# Patient Record
Sex: MALE | Race: WHITE | URBAN - METROPOLITAN AREA
[De-identification: names, ages, dates, MRNs, and addresses within clinical notes are randomized per-mention and may not be internally consistent; named-entity substitution may affect disease eponyms.]

---

## 2018-04-06 NOTE — LETTER
07/11/23        Bernardo Negron   [address] [place] [address]      Dear Jeison Bloom,    [facility] records indicate that you have outstanding lab work and or testing that was ordered for you and has not yet been completed:      CBC With Diff      CMP      Lipid      TSH W Reflex To Free T4      PSA - Total   To provide you with the best possible care, please complete these orders at your earliest convenience. If you have recently completed these orders please disregard this letter. If you have any questions please call the office at: [phone].      Thank you,   Rianna Palmer MD verbalization

## 2023-06-07 ENCOUNTER — OFFICE VISIT (OUTPATIENT)
Dept: FAMILY MEDICINE CLINIC | Facility: CLINIC | Age: 58
End: 2023-06-07
Payer: COMMERCIAL

## 2023-06-07 VITALS
HEIGHT: 67.48 IN | SYSTOLIC BLOOD PRESSURE: 132 MMHG | DIASTOLIC BLOOD PRESSURE: 86 MMHG | OXYGEN SATURATION: 97 % | BODY MASS INDEX: 28.18 KG/M2 | RESPIRATION RATE: 18 BRPM | TEMPERATURE: 98 F | HEART RATE: 95 BPM | WEIGHT: 181.63 LBS

## 2023-06-07 DIAGNOSIS — Z13.29 SCREENING FOR ENDOCRINE, NUTRITIONAL, METABOLIC AND IMMUNITY DISORDER: ICD-10-CM

## 2023-06-07 DIAGNOSIS — Z13.228 SCREENING FOR ENDOCRINE, NUTRITIONAL, METABOLIC AND IMMUNITY DISORDER: ICD-10-CM

## 2023-06-07 DIAGNOSIS — Z12.5 SCREENING FOR PROSTATE CANCER: ICD-10-CM

## 2023-06-07 DIAGNOSIS — Z13.0 SCREENING FOR ENDOCRINE, NUTRITIONAL, METABOLIC AND IMMUNITY DISORDER: ICD-10-CM

## 2023-06-07 DIAGNOSIS — B00.1 HERPES LABIALIS: ICD-10-CM

## 2023-06-07 DIAGNOSIS — E66.3 OVERWEIGHT (BMI 25.0-29.9): ICD-10-CM

## 2023-06-07 DIAGNOSIS — Z85.828 HISTORY OF SKIN CANCER: ICD-10-CM

## 2023-06-07 DIAGNOSIS — Z13.21 SCREENING FOR ENDOCRINE, NUTRITIONAL, METABOLIC AND IMMUNITY DISORDER: ICD-10-CM

## 2023-06-07 DIAGNOSIS — Z13.6 SCREENING FOR ISCHEMIC HEART DISEASE: ICD-10-CM

## 2023-06-07 DIAGNOSIS — J30.1 SEASONAL ALLERGIC RHINITIS DUE TO POLLEN: ICD-10-CM

## 2023-06-07 DIAGNOSIS — Z00.00 ANNUAL PHYSICAL EXAM: Primary | ICD-10-CM

## 2023-06-07 PROCEDURE — 3079F DIAST BP 80-89 MM HG: CPT | Performed by: FAMILY MEDICINE

## 2023-06-07 PROCEDURE — 99386 PREV VISIT NEW AGE 40-64: CPT | Performed by: FAMILY MEDICINE

## 2023-06-07 PROCEDURE — 3008F BODY MASS INDEX DOCD: CPT | Performed by: FAMILY MEDICINE

## 2023-06-07 PROCEDURE — 3075F SYST BP GE 130 - 139MM HG: CPT | Performed by: FAMILY MEDICINE

## 2023-06-07 RX ORDER — ACYCLOVIR 50 MG/G
1 OINTMENT TOPICAL EVERY 6 HOURS PRN
Qty: 30 G | Refills: 1 | Status: SHIPPED | OUTPATIENT
Start: 2023-06-07

## 2023-06-07 RX ORDER — FLUTICASONE PROPIONATE 50 MCG
2 SPRAY, SUSPENSION (ML) NASAL DAILY
Qty: 1 EACH | Refills: 5 | Status: SHIPPED | OUTPATIENT
Start: 2023-06-07

## 2023-06-08 ENCOUNTER — TELEPHONE (OUTPATIENT)
Dept: FAMILY MEDICINE CLINIC | Facility: CLINIC | Age: 58
End: 2023-06-08

## 2023-06-08 NOTE — TELEPHONE ENCOUNTER
Record of realse sent to [facility] for colonoscopy report information at [phone].  Fax confirmed 6/8/23

## 2023-06-08 NOTE — TELEPHONE ENCOUNTER
Record of realest sent to  Bill Rebolledo at [phone] for shingles vaccine that was given to patient.  Fax confirmed 6/8/23

## 2023-06-29 ENCOUNTER — TELEPHONE (OUTPATIENT)
Dept: FAMILY MEDICINE CLINIC | Facility: CLINIC | Age: 58
End: 2023-06-29

## 2023-06-29 NOTE — TELEPHONE ENCOUNTER
Pt called office stating that his pharmacy stated that they never received the prescription for his allergy spray and for his a lip virus.  Pt wants to know what is going on

## 2023-06-29 NOTE — TELEPHONE ENCOUNTER
Spoke to pharmacy, they did receive the prescriptions however since patient did not pick them up when they  were ordered 6/ 7 they eventually put them back. The  prescriptions will be ready for pickup shortly.      Called patient and LMTCB

## 2023-09-09 LAB
ABSOLUTE BASOPHILS: 43 CELLS/UL (ref 0–200)
ABSOLUTE EOSINOPHILS: 223 CELLS/UL (ref 15–500)
ABSOLUTE LYMPHOCYTES: 2354 CELLS/UL (ref 850–3900)
ABSOLUTE MONOCYTES: 461 CELLS/UL (ref 200–950)
ABSOLUTE NEUTROPHILS: 4118 CELLS/UL (ref 1500–7800)
ALBUMIN/GLOBULIN RATIO: 1.4 (CALC) (ref 1–2.5)
ALBUMIN: 4.4 G/DL (ref 3.6–5.1)
ALKALINE PHOSPHATASE: 94 U/L (ref 35–144)
ALT: 47 U/L (ref 9–46)
AST: 36 U/L (ref 10–35)
BASOPHILS: 0.6 %
BILIRUBIN, TOTAL: 0.9 MG/DL (ref 0.2–1.2)
BUN: 12 MG/DL (ref 7–25)
CALCIUM: 9.4 MG/DL (ref 8.6–10.3)
CARBON DIOXIDE: 30 MMOL/L (ref 20–32)
CHLORIDE: 101 MMOL/L (ref 98–110)
CHOL/HDLC RATIO: 4.5 (CALC)
CHOLESTEROL, TOTAL: 194 MG/DL
CREATININE: 0.96 MG/DL (ref 0.7–1.3)
EGFR: 93 ML/MIN/1.73M2
EOSINOPHILS: 3.1 %
GLOBULIN: 3.2 G/DL (CALC) (ref 1.9–3.7)
GLUCOSE: 92 MG/DL (ref 65–99)
HDL CHOLESTEROL: 43 MG/DL
HEMATOCRIT: 51.2 % (ref 38.5–50)
HEMOGLOBIN: 17.2 G/DL (ref 13.2–17.1)
LDL-CHOLESTEROL: 125 MG/DL (CALC)
LYMPHOCYTES: 32.7 %
MCH: 31.4 PG (ref 27–33)
MCHC: 33.6 G/DL (ref 32–36)
MCV: 93.4 FL (ref 80–100)
MONOCYTES: 6.4 %
MPV: 9.6 FL (ref 7.5–12.5)
NEUTROPHILS: 57.2 %
NON-HDL CHOLESTEROL: 151 MG/DL (CALC)
PLATELET COUNT: 275 THOUSAND/UL (ref 140–400)
POTASSIUM: 4.6 MMOL/L (ref 3.5–5.3)
PROTEIN, TOTAL: 7.6 G/DL (ref 6.1–8.1)
PSA, TOTAL: 0.3 NG/ML
RDW: 12.2 % (ref 11–15)
RED BLOOD CELL COUNT: 5.48 MILLION/UL (ref 4.2–5.8)
SODIUM: 139 MMOL/L (ref 135–146)
TRIGLYCERIDES: 147 MG/DL
TSH W/REFLEX TO FT4: 3.14 MIU/L (ref 0.4–4.5)
WHITE BLOOD CELL COUNT: 7.2 THOUSAND/UL (ref 3.8–10.8)

## 2024-01-25 ENCOUNTER — MED REC SCAN ONLY (OUTPATIENT)
Dept: FAMILY MEDICINE CLINIC | Facility: CLINIC | Age: 59
End: 2024-01-25

## 2024-03-15 ENCOUNTER — OFFICE VISIT (OUTPATIENT)
Dept: FAMILY MEDICINE CLINIC | Facility: CLINIC | Age: 59
End: 2024-03-15
Payer: COMMERCIAL

## 2024-03-15 VITALS
SYSTOLIC BLOOD PRESSURE: 138 MMHG | RESPIRATION RATE: 18 BRPM | WEIGHT: 181.63 LBS | OXYGEN SATURATION: 97 % | BODY MASS INDEX: 28 KG/M2 | TEMPERATURE: 98 F | HEART RATE: 80 BPM | DIASTOLIC BLOOD PRESSURE: 84 MMHG

## 2024-03-15 DIAGNOSIS — Z23 NEED FOR VACCINATION: ICD-10-CM

## 2024-03-15 DIAGNOSIS — Z13.29 SCREENING FOR ENDOCRINE, NUTRITIONAL, METABOLIC AND IMMUNITY DISORDER: ICD-10-CM

## 2024-03-15 DIAGNOSIS — Z00.00 ANNUAL PHYSICAL EXAM: Primary | ICD-10-CM

## 2024-03-15 DIAGNOSIS — Z13.228 SCREENING FOR ENDOCRINE, NUTRITIONAL, METABOLIC AND IMMUNITY DISORDER: ICD-10-CM

## 2024-03-15 DIAGNOSIS — B00.1 HERPES LABIALIS: ICD-10-CM

## 2024-03-15 DIAGNOSIS — Z13.6 SCREENING FOR ISCHEMIC HEART DISEASE: ICD-10-CM

## 2024-03-15 DIAGNOSIS — J30.1 SEASONAL ALLERGIC RHINITIS DUE TO POLLEN: ICD-10-CM

## 2024-03-15 DIAGNOSIS — Z13.21 SCREENING FOR ENDOCRINE, NUTRITIONAL, METABOLIC AND IMMUNITY DISORDER: ICD-10-CM

## 2024-03-15 DIAGNOSIS — H10.9 BACTERIAL CONJUNCTIVITIS OF RIGHT EYE: ICD-10-CM

## 2024-03-15 DIAGNOSIS — Z13.0 SCREENING FOR ENDOCRINE, NUTRITIONAL, METABOLIC AND IMMUNITY DISORDER: ICD-10-CM

## 2024-03-15 PROCEDURE — 99396 PREV VISIT EST AGE 40-64: CPT | Performed by: FAMILY MEDICINE

## 2024-03-15 RX ORDER — POLYMYXIN B SULFATE AND TRIMETHOPRIM 1; 10000 MG/ML; [USP'U]/ML
1 SOLUTION OPHTHALMIC EVERY 6 HOURS
Qty: 10 ML | Refills: 0 | Status: SHIPPED | OUTPATIENT
Start: 2024-03-15 | End: 2024-03-22

## 2024-03-19 NOTE — PROGRESS NOTES
Chief Complaint   Patient presents with    Physical     Annual exam        HPI:   Mj Arechiga is a 58 year old male who presents for a complete physical exam.   Patient is present for complete physical. Feels very well. Up to date with dental visits.No hearing problems. Vaccinations: declines Flu    Annual physical:  Overall, pt states he feels well.     Sexually active: monogamous with wife  Last PSA: 9/2023, normal (rpt in 2 yrs)  Last colonoscopy: 9/2015 (rpt in 10 yrs)    Exercise: \"I need to move more.\"  Diet: regular, loves chocolate    Needs eyedrops:  he states when his allergies flareup, he is prone to pink eye and was Rx'd Polytrim eye drops in the past to use when this happens. He will use the drops for a few days and infection resolves.Currently he has no sx.    Seasonal allergies:  Takes Loratadine, finds this helpful with relieving sx.     Herpes labialis:  States he gets a flare up when he eats too much of peanuts. Uses Zovirax prn.                  Wt Readings from Last 3 Encounters:   03/15/24 181 lb 9.6 oz (82.4 kg)   06/07/23 181 lb 9.6 oz (82.4 kg)      BP Readings from Last 3 Encounters:   03/15/24 138/84   06/07/23 132/86       CHOLESTEROL, TOTAL (mg/dL)   Date Value   09/08/2023 194     HDL CHOLESTEROL (mg/dL)   Date Value   09/08/2023 43     LDL-CHOLESTEROL (mg/dL (calc))   Date Value   09/08/2023 125 (H)     AST (U/L)   Date Value   09/08/2023 36 (H)     ALT (U/L)   Date Value   09/08/2023 47 (H)      Current Outpatient Medications   Medication Sig Dispense Refill    polymyxin B-trimethoprim 98651-6.1 UNIT/ML-% Ophthalmic Solution Place 1 drop into the right eye every 6 (six) hours for 7 days. 10 mL 0    acyclovir 5 % External Ointment Apply 1 Application topically every 6 (six) hours as needed. 30 g 1    fluticasone propionate 50 MCG/ACT Nasal Suspension 2 sprays by Each Nare route daily. 1 each 5      Past Medical History:   Diagnosis Date    Allergic rhinitis       Past Surgical  History:   Procedure Laterality Date    DESTRUCTION OF SKIN LESION      removal of skin lesion on above left eyebrow 2011      Family History   Problem Relation Age of Onset    Dementia Mother         Alzheimer's    No Known Problems Father     No Known Problems Sister     No Known Problems Brother     Prostate Cancer Neg     Colon Cancer Neg       Social History     Socioeconomic History    Marital status:    Tobacco Use    Smoking status: Former    Smokeless tobacco: Never   Substance and Sexual Activity    Alcohol use: Yes    Drug use: Never   Other Topics Concern    Caffeine Concern No    Exercise Yes    Seat Belt Yes    Special Diet No    Stress Concern No    Weight Concern No     Exercise:  some walking at work, occasional.  Diet:  regular   Allergies:  No Known Allergies     REVIEW OF SYSTEMS:     Review of Systems   Constitutional:  Negative for appetite change and fatigue.   HENT:  Negative for congestion.    Eyes:  Negative for discharge and redness.   Gastrointestinal:  Negative for abdominal pain, constipation, diarrhea, nausea and vomiting.   Genitourinary:  Negative for dysuria.   Neurological:  Negative for dizziness and headaches.        EXAM:   /84 (BP Location: Left arm, Patient Position: Sitting, Cuff Size: adult)   Pulse 80   Temp 98.1 °F (36.7 °C) (Temporal)   Resp 18   Wt 181 lb 9.6 oz (82.4 kg)   SpO2 97%   BMI 28.04 kg/m²   GENERAL: WD/WN in no acute distress.   HEENT: PERRLA and EOMI.  OP moist no lesions. TM normal, canals normal.  NECK: is supple,thyroid- normal.  LUNGS: are clear to auscultation bilaterally, with no wheeze, rhonchi, or rales.  HEART: is RRR.  S1, S2, with no murmurs.    ABDOMEN: is soft, NT/ND with no HSM.  No rebound or guarding, NABS.     EXAM: deferred  RECTAL EXAM: deferred  EXTREMITIES: are symmetric with no cyanosis, clubbing, or edema.    SKIN: is unremarkable without rashes.    NEURO: no focal abnormalities, and reflexes coordination and gait  normal and symmetric.   MUSK/SKEL: Normal muscles tones, no joint abnormalities, full ROM of all extremities.    back- normal curves, no scoliosis, normal gait,  No joint or muscle abnormalities.  PSYCH: pt is alert, oriented x 3, normal affect.    ASSESSMENT AND PLAN:   Mj Arechiga is a 58 year old male who presents for a complete physical exam.     1. Annual physical exam  Routine labs ordered today, await results. Counseled pt on healthy lifestyle changes. Vaccines today: declines FLu  .    Last PSA: 9/2023, normal (rpt in 2 yrs)  Last colonoscopy: 9/2015 (rpt in 10 yrs)     - CBC With Differential With Platelet  - Comp Metabolic Panel  - Lipid Panel  - TSH W Reflex To Free T4    2. Bacterial conjunctivitis of right eye  - pt prone to pink eye when seasonal allergies flare up  - Rx for Polytrim eye drops to use prn, R/B/SE of new med d/w pt    - polymyxin B-trimethoprim 70338-3.1 UNIT/ML-% Ophthalmic Solution; Place 1 drop into the right eye every 6 (six) hours for 7 days.  Dispense: 10 mL; Refill: 0    3. Herpes labialis  - fill Acyclovir, flareups respond well to this medication    4. Seasonal allergic rhinitis due to pollen  - cont PO antihistmaine prn  - may useFluticasone nasal spray daily     5. Screening for ischemic heart disease    - Lipid Panel    6. Screening for endocrine, nutritional, metabolic and immunity disorder    - CBC With Differential With Platelet  - Comp Metabolic Panel  - TSH W Reflex To Free T4    7. Need for vaccination    - INFLUENZA REFUSED [facility]      Pt's weight is Body mass index is 28.04 kg/m²., recommended low fat diet and aerobic exercise 30 minutes three times weekly.   The patient indicates understanding of these issues and agrees to the plan.    Return in about 1 year (around 3/15/2025) for annual physical.

## 2024-05-09 LAB
ABSOLUTE BASOPHILS: 50 CELLS/UL (ref 0–200)
ABSOLUTE EOSINOPHILS: 170 CELLS/UL (ref 15–500)
ABSOLUTE LYMPHOCYTES: 1974 CELLS/UL (ref 850–3900)
ABSOLUTE MONOCYTES: 518 CELLS/UL (ref 200–950)
ABSOLUTE NEUTROPHILS: 4388 CELLS/UL (ref 1500–7800)
ALBUMIN/GLOBULIN RATIO: 1.5 (CALC) (ref 1–2.5)
ALBUMIN: 4.4 G/DL (ref 3.6–5.1)
ALKALINE PHOSPHATASE: 95 U/L (ref 35–144)
ALT: 36 U/L (ref 9–46)
AST: 30 U/L (ref 10–35)
BASOPHILS: 0.7 %
BILIRUBIN, TOTAL: 0.6 MG/DL (ref 0.2–1.2)
BUN: 14 MG/DL (ref 7–25)
CALCIUM: 9.1 MG/DL (ref 8.6–10.3)
CARBON DIOXIDE: 28 MMOL/L (ref 20–32)
CHLORIDE: 103 MMOL/L (ref 98–110)
CHOL/HDLC RATIO: 3.9 (CALC)
CHOLESTEROL, TOTAL: 182 MG/DL
CREATININE: 0.84 MG/DL (ref 0.7–1.3)
EGFR: 102 ML/MIN/1.73M2
EOSINOPHILS: 2.4 %
GLOBULIN: 3 G/DL (CALC) (ref 1.9–3.7)
GLUCOSE: 92 MG/DL (ref 65–99)
HDL CHOLESTEROL: 47 MG/DL
HEMATOCRIT: 49.4 % (ref 38.5–50)
HEMOGLOBIN: 16.9 G/DL (ref 13.2–17.1)
LDL-CHOLESTEROL: 110 MG/DL (CALC)
LYMPHOCYTES: 27.8 %
MCH: 31.6 PG (ref 27–33)
MCHC: 34.2 G/DL (ref 32–36)
MCV: 92.5 FL (ref 80–100)
MONOCYTES: 7.3 %
MPV: 9.5 FL (ref 7.5–12.5)
NEUTROPHILS: 61.8 %
NON-HDL CHOLESTEROL: 135 MG/DL (CALC)
PLATELET COUNT: 260 THOUSAND/UL (ref 140–400)
POTASSIUM: 4.2 MMOL/L (ref 3.5–5.3)
PROTEIN, TOTAL: 7.4 G/DL (ref 6.1–8.1)
RDW: 12.8 % (ref 11–15)
RED BLOOD CELL COUNT: 5.34 MILLION/UL (ref 4.2–5.8)
SODIUM: 140 MMOL/L (ref 135–146)
TRIGLYCERIDES: 139 MG/DL
TSH W/REFLEX TO FT4: 2.88 MIU/L (ref 0.4–4.5)
WHITE BLOOD CELL COUNT: 7.1 THOUSAND/UL (ref 3.8–10.8)

## 2024-07-23 ENCOUNTER — OFFICE VISIT (OUTPATIENT)
Dept: FAMILY MEDICINE CLINIC | Facility: CLINIC | Age: 59
End: 2024-07-23
Payer: COMMERCIAL

## 2024-07-23 VITALS
RESPIRATION RATE: 18 BRPM | BODY MASS INDEX: 28 KG/M2 | DIASTOLIC BLOOD PRESSURE: 78 MMHG | SYSTOLIC BLOOD PRESSURE: 132 MMHG | OXYGEN SATURATION: 99 % | TEMPERATURE: 97 F | WEIGHT: 179.19 LBS | HEART RATE: 80 BPM

## 2024-07-23 DIAGNOSIS — J34.3 NASAL TURBINATE HYPERTROPHY: ICD-10-CM

## 2024-07-23 DIAGNOSIS — R05.3 CHRONIC COUGH: Primary | ICD-10-CM

## 2024-07-23 PROCEDURE — 99214 OFFICE O/P EST MOD 30 MIN: CPT | Performed by: FAMILY MEDICINE

## 2024-07-23 RX ORDER — PREDNISONE 20 MG/1
20 TABLET ORAL 2 TIMES DAILY
Qty: 10 TABLET | Refills: 0 | Status: SHIPPED | OUTPATIENT
Start: 2024-07-23 | End: 2024-07-28

## 2024-07-23 RX ORDER — CHLORPHENIRAMINE MALEATE 4 MG/1
4 TABLET ORAL EVERY 6 HOURS PRN
Qty: 30 TABLET | Refills: 0 | Status: SHIPPED | OUTPATIENT
Start: 2024-07-23

## 2024-07-23 NOTE — PATIENT INSTRUCTIONS
Switch to allegra daily from zyrtec daily     Cont on flonase daily     Prednisone x 5 days     Chlorpheniramine as needed for cough     If no better in 2 weeks, notify office

## 2024-07-23 NOTE — PROGRESS NOTES
HPI:     Mj Arechiga is a 59 year old male presents for    Chronic cough for 4 weeks.  He normally sees Dr. Ferreira.  Was in [place] 3 weeks ago and received 3 injections of PCN there.  Went back home and did [facility] who gave him amox.  No improvement so went back to [facility] and got z pack.  Just finished this 3 days ago.  Still no better.  Has post nasal drip and coughing up yellow/green sputum in am.  No breathing difficulties.  Ears feel plugged.  Felt warm, not checking temperature.    No body aches.  Pain in throat.  Kids and wife were sick w/ similar but better now.  No cxr or COVID testing done.  Was told nothing they could do if it was COVID now.  Has been taking ibuprofen and mucinex which has not helped much.      Medications (Active prior to today's visit):  Current Outpatient Medications   Medication Sig Dispense Refill    acyclovir 5 % External Ointment Apply 1 Application topically every 6 (six) hours as needed. 30 g 1    fluticasone propionate 50 MCG/ACT Nasal Suspension 2 sprays by Each Nare route daily. 1 each 5       Allergies:  No Known Allergies    PSFH elements reviewed from today and agreed except as otherwise stated in HPI.  ROS:      Pertinent positives and negatives noted in the the HPI.    PHYSICAL EXAM:     Vitals:    07/23/24 0856   BP: 132/78   BP Location: Left arm   Patient Position: Sitting   Cuff Size: adult   Pulse: 80   Resp: 18   Temp: 97.2 °F (36.2 °C)   TempSrc: Temporal   SpO2: 99%   Weight: 179 lb 3.2 oz (81.3 kg)     Vital signs reviewed.Appears stated age, well groomed.  Physical Exam  Constitutional:       Appearance: Normal appearance. He is well-developed and well-groomed.   HENT:      Right Ear: Ear canal normal. A middle ear effusion (clear) is present.      Left Ear: Ear canal normal. A middle ear effusion (clear) is present.      Nose:      Right Turbinates: Enlarged and swollen.      Left Turbinates: Enlarged and swollen.      Mouth/Throat:      Mouth:  Mucous membranes are moist.      Pharynx: Oropharynx is clear.   Cardiovascular:      Rate and Rhythm: Normal rate and regular rhythm.      Pulses: Normal pulses.      Heart sounds: No murmur heard.     No friction rub. No gallop.   Pulmonary:      Effort: Pulmonary effort is normal. No respiratory distress.      Breath sounds: No wheezing, rhonchi or rales.   Musculoskeletal:         General: No tenderness.      Cervical back: Neck supple.      Right lower leg: No edema.      Left lower leg: No edema.   Lymphadenopathy:      Head:      Right side of head: No submental, submandibular, preauricular or posterior auricular adenopathy.      Left side of head: No submental, submandibular, preauricular or posterior auricular adenopathy.      Cervical: No cervical adenopathy.      Upper Body:      Right upper body: No supraclavicular adenopathy.      Left upper body: No supraclavicular adenopathy.   Skin:     General: Skin is warm.   Neurological:      General: No focal deficit present.      Mental Status: He is alert.   Psychiatric:         Mood and Affect: Mood normal.         Speech: Speech normal.         Behavior: Behavior normal. Behavior is cooperative.          ASSESSMENT/PLAN:   59 year old male with    1. Chronic cough    2. Nasal turbinate hypertrophy      I have spent 30 minutes in the care of this patient including review of their past medical records and diagnostic tests, updating their chart, seeing the patient, discussing current treatment plans and documenting the encounter.      Lung exam CTAB.  Suspect cough is coming from upper airways.  Recommend Flonase daily.  Also recommend switching to Allegra daily.  Can use chlorpheniramine as needed for cough  Also recommend prednisone burst 40 mg daily for 5 days  If no improvement in 2 weeks, consider follow-up with ENT  Follow-up with PCP as previously recommended by PCP    Patient/Caregiver Education: There are no barriers to learning. Medical education  done.   Outcome: Patient verbalizes understanding and agrees with plan. Advised to call or RTC if symptoms persist or worsen.    7/23/2024  Abena Mae, DO  Patient understands plan and follow-up.

## 2025-02-21 ENCOUNTER — OFFICE VISIT (OUTPATIENT)
Dept: FAMILY MEDICINE CLINIC | Facility: CLINIC | Age: 60
End: 2025-02-21
Payer: COMMERCIAL

## 2025-02-21 VITALS
RESPIRATION RATE: 18 BRPM | BODY MASS INDEX: 27.54 KG/M2 | TEMPERATURE: 97 F | HEIGHT: 68.4 IN | SYSTOLIC BLOOD PRESSURE: 142 MMHG | HEART RATE: 78 BPM | OXYGEN SATURATION: 100 % | DIASTOLIC BLOOD PRESSURE: 88 MMHG | WEIGHT: 183.81 LBS

## 2025-02-21 DIAGNOSIS — L73.1 INGROWN HAIR: ICD-10-CM

## 2025-02-21 DIAGNOSIS — Z12.11 COLON CANCER SCREENING: ICD-10-CM

## 2025-02-21 DIAGNOSIS — Z12.5 PROSTATE CANCER SCREENING: ICD-10-CM

## 2025-02-21 DIAGNOSIS — Z13.0 SCREENING FOR ENDOCRINE, NUTRITIONAL, METABOLIC AND IMMUNITY DISORDER: ICD-10-CM

## 2025-02-21 DIAGNOSIS — Z13.29 SCREENING FOR ENDOCRINE, NUTRITIONAL, METABOLIC AND IMMUNITY DISORDER: ICD-10-CM

## 2025-02-21 DIAGNOSIS — Z13.21 SCREENING FOR ENDOCRINE, NUTRITIONAL, METABOLIC AND IMMUNITY DISORDER: ICD-10-CM

## 2025-02-21 DIAGNOSIS — Z13.6 SCREENING FOR ISCHEMIC HEART DISEASE: ICD-10-CM

## 2025-02-21 DIAGNOSIS — Z00.00 ANNUAL PHYSICAL EXAM: Primary | ICD-10-CM

## 2025-02-21 DIAGNOSIS — Z13.228 SCREENING FOR ENDOCRINE, NUTRITIONAL, METABOLIC AND IMMUNITY DISORDER: ICD-10-CM

## 2025-02-21 DIAGNOSIS — Z23 NEED FOR VACCINATION: ICD-10-CM

## 2025-02-21 PROCEDURE — 99396 PREV VISIT EST AGE 40-64: CPT | Performed by: FAMILY MEDICINE

## 2025-02-21 RX ORDER — MUPIROCIN 20 MG/G
1 OINTMENT TOPICAL 3 TIMES DAILY
Qty: 22 G | Refills: 1 | Status: SHIPPED | OUTPATIENT
Start: 2025-02-21

## 2025-02-23 NOTE — PROGRESS NOTES
Chief Complaint   Patient presents with    Physical     ANNUAL EXAM        HPI:   Mj Arechiga is a 59 year old male who presents for a complete physical exam.   Patient is present for complete physical. Feels very well. Up to date with dental visits.No hearing problems. Vaccinations: declines vaccines.    Annual physical:  Overall, pt states he feels well.     Sexually active: monogamous  Last PSA: 2023, normal (ordered for )  Last Colon Ca screenin2015, colonoscopy normal (due later this year, referral given)    Exercise: working out 2x/week, stretching  Diet: regular, \"I love sugar\"    Derm problem: pt noticed on his lower abdomen he had a pimple that grew larger and slightly more tender; no drainage. No F/C. He noticed the lesion enlarged in a few days, but is less painful now.     Of note, pt's wife noticied PCP of pt snoring.  Pt admits to snoring, but denies any daytime drowsiness. He states he wakes up feeling refreshed in the morning. He has no apnea episodes at night.  He does not drink any caffeine.         Wt Readings from Last 3 Encounters:   25 183 lb 12.8 oz (83.4 kg)   24 179 lb 3.2 oz (81.3 kg)   03/15/24 181 lb 9.6 oz (82.4 kg)      BP Readings from Last 3 Encounters:   25 142/88   24 132/78   03/15/24 138/84       CHOLESTEROL, TOTAL (mg/dL)   Date Value   2024 182   2023 194     HDL CHOLESTEROL (mg/dL)   Date Value   2024 47   2023 43     LDL-CHOLESTEROL (mg/dL (calc))   Date Value   2024 110 (H)   2023 125 (H)     AST (U/L)   Date Value   2024 30   2023 36 (H)     ALT (U/L)   Date Value   2024 36   2023 47 (H)      Current Outpatient Medications   Medication Sig Dispense Refill    mupirocin 2 % External Ointment Apply 1 Application topically 3 (three) times daily. 22 g 1    chlorpheniramine 4 MG Oral Tab Take 1 tablet (4 mg total) by mouth every 6 (six) hours as needed. 30 tablet 0    acyclovir 5 %  External Ointment Apply 1 Application topically every 6 (six) hours as needed. 30 g 1    fluticasone propionate 50 MCG/ACT Nasal Suspension 2 sprays by Each Nare route daily. 1 each 5      Past Medical History:    Allergic rhinitis      Past Surgical History:   Procedure Laterality Date    Destruction of skin lesion      removal of skin lesion on above left eyebrow 2011      Family History   Problem Relation Age of Onset    Dementia Mother         Alzheimer's    No Known Problems Father     No Known Problems Sister     No Known Problems Brother     Prostate Cancer Neg     Colon Cancer Neg       Social History     Socioeconomic History    Marital status:    Tobacco Use    Smoking status: Never    Smokeless tobacco: Never   Vaping Use    Vaping status: Never Used   Substance and Sexual Activity    Alcohol use: Yes    Drug use: Never   Other Topics Concern    Caffeine Concern No    Exercise Yes    Seat Belt Yes    Special Diet No    Stress Concern No    Weight Concern No     Exercise:  twice per week, strethching .  Diet:  regular, \"I love sugar\"   Allergies:  Allergies[1]     REVIEW OF SYSTEMS:     Review of Systems   Constitutional:  Negative for appetite change, chills, fatigue and fever.   Gastrointestinal:  Negative for abdominal pain, constipation, diarrhea, nausea and vomiting.   Genitourinary:  Negative for dysuria.   Skin:  Positive for color change.   Psychiatric/Behavioral:  Negative for sleep disturbance.         EXAM:   /88 (BP Location: Left arm, Patient Position: Sitting, Cuff Size: adult)   Pulse 78   Temp 97.3 °F (36.3 °C) (Temporal)   Resp 18   Ht 5' 8.4\" (1.737 m)   Wt 183 lb 12.8 oz (83.4 kg)   SpO2 100%   BMI 27.62 kg/m²   GENERAL: WD/WN in no acute distress.   HEENT: PERRLA and EOMI.  OP moist no lesions. TM normal, canals normal.  NECK: is supple,thyroid- normal.  LUNGS: are clear to auscultation bilaterally, with no wheeze, rhonchi, or rales.  HEART: is RRR.  S1, S2, with no  murmurs.    ABDOMEN: is soft, NT/ND with no HSM.  No rebound or guarding, NABS.     EXAM: deferred  RECTAL EXAM: deferred  EXTREMITIES: are symmetric with no cyanosis, clubbing, or edema.    SKIN: is unremarkable without rashes.  Has resolving nonfluctaunt healing abscess of right lower abdomen, no open wound, no drainage. Nontender. Mild erythema.  NEURO: no focal abnormalities, and reflexes coordination and gait normal and symmetric.   MUSK/SKEL: Normal muscles tones, no joint abnormalities, full ROM of all extremities.    back- normal curves, no scoliosis, normal gait,  No joint or muscle abnormalities.  PSYCH: pt is alert, oriented x 3, normal affect.    ASSESSMENT AND PLAN:   Mj Arechiga is a 59 year old male who presents for a complete physical exam.     1. Annual physical exam  Routine labs ordered today, await results. Counseled pt on healthy lifestyle changes. Vaccines today: declines vaccines today      Last PSA: 2023, normal (ordered for )  Last Colon Ca screenin2015, colonoscopy normal (due later this year, referral given)      - CBC With Differential With Platelet  - Comp Metabolic Panel  - Lipid Panel  - TSH W Reflex To Free T4    2. Ingrown hair  - pt had ingrown hair, prefers topical treatment vs PO  - Rx sent for Mupirocin to apply TID prn, R/B/SE of new med d/w pt    - mupirocin 2 % External Ointment; Apply 1 Application topically 3 (three) times daily.  Dispense: 22 g; Refill: 1    3. Screening for ischemic heart disease    - Lipid Panel    4. Screening for endocrine, nutritional, metabolic and immunity disorder    - CBC With Differential With Platelet  - Comp Metabolic Panel  - TSH W Reflex To Free T4    5. Need for vaccination    - INFLUENZA REFUSED [facility]    6. Prostate cancer screening    - PSA - Total [5363][Q]    7. Colon cancer screening    - Gastro Referral - In Network      Pt's weight is Body mass index is 27.62 kg/m²., recommended low fat diet and aerobic exercise 30  minutes three times weekly.   The patient indicates understanding of these issues and agrees to the plan.    Return in about 1 year (around 2/21/2026) for annual physical.           [1] No Known Allergies

## 2025-03-19 ENCOUNTER — OFFICE VISIT (OUTPATIENT)
Dept: FAMILY MEDICINE CLINIC | Facility: CLINIC | Age: 60
End: 2025-03-19
Payer: COMMERCIAL

## 2025-03-19 VITALS
WEIGHT: 182 LBS | RESPIRATION RATE: 16 BRPM | DIASTOLIC BLOOD PRESSURE: 82 MMHG | BODY MASS INDEX: 27 KG/M2 | OXYGEN SATURATION: 96 % | HEART RATE: 97 BPM | SYSTOLIC BLOOD PRESSURE: 154 MMHG | TEMPERATURE: 97 F

## 2025-03-19 DIAGNOSIS — R09.82 POST-NASAL DRIP: ICD-10-CM

## 2025-03-19 DIAGNOSIS — J01.00 ACUTE MAXILLARY SINUSITIS, RECURRENCE NOT SPECIFIED: Primary | ICD-10-CM

## 2025-03-19 PROCEDURE — 99214 OFFICE O/P EST MOD 30 MIN: CPT | Performed by: FAMILY MEDICINE

## 2025-03-19 RX ORDER — FLUTICASONE PROPIONATE 50 MCG
2 SPRAY, SUSPENSION (ML) NASAL DAILY
Qty: 1 EACH | Refills: 0 | Status: SHIPPED | OUTPATIENT
Start: 2025-03-19

## 2025-03-19 RX ORDER — BENZONATATE 200 MG/1
200 CAPSULE ORAL 3 TIMES DAILY PRN
COMMUNITY
Start: 2025-03-11

## 2025-03-19 NOTE — PROGRESS NOTES
CHIEF COMPLAINT:   Chief Complaint   Patient presents with    Cough    Sinus Problem         HPI:     Mj Arechiga is a 59 year old male presents for sinus problem and cough.    Cough, sinus problem: Pt reports he was sick for several weeks with sore throat, sinus problems, nasal discharge- he completed a telehealth visit through a service through work on 3/10.  Was Rx'd Azithromycin and Benzonatate.  States he did not have any testing for Covid or Flu completed.  Was having chills.  Today he is bothered by the persistent cough, productive with green sputum.  He is able to sleep at night. Denies any sick contacts.               HISTORY:  Past Medical History:    Allergic rhinitis      Past Surgical History:   Procedure Laterality Date    Destruction of skin lesion      removal of skin lesion on above left eyebrow 2011      Family History   Problem Relation Age of Onset    Dementia Mother         Alzheimer's    No Known Problems Father     No Known Problems Sister     No Known Problems Brother     Prostate Cancer Neg     Colon Cancer Neg       Social History:   Social History     Socioeconomic History    Marital status:    Tobacco Use    Smoking status: Never    Smokeless tobacco: Never   Vaping Use    Vaping status: Never Used   Substance and Sexual Activity    Alcohol use: Yes    Drug use: Never   Other Topics Concern    Caffeine Concern No    Exercise Yes    Seat Belt Yes    Special Diet No    Stress Concern No    Weight Concern No        Medications (Active prior to today's visit):  Current Outpatient Medications   Medication Sig Dispense Refill    benzonatate 200 MG Oral Cap Take 1 capsule (200 mg total) by mouth 3 (three) times daily as needed for cough.      fluticasone propionate 50 MCG/ACT Nasal Suspension 2 sprays by Each Nare route daily. 1 each 0    amoxicillin clavulanate 875-125 MG Oral Tab Take 1 tablet by mouth 2 (two) times daily for 7 days. 14 tablet 0    mupirocin 2 % External Ointment  Apply 1 Application topically 3 (three) times daily. 22 g 1    chlorpheniramine 4 MG Oral Tab Take 1 tablet (4 mg total) by mouth every 6 (six) hours as needed. 30 tablet 0    acyclovir 5 % External Ointment Apply 1 Application topically every 6 (six) hours as needed. 30 g 1    fluticasone propionate 50 MCG/ACT Nasal Suspension 2 sprays by Each Nare route daily. 1 each 5       Allergies:  Allergies[1]    PSFH elements reviewed from today and agreed except as otherwise stated in HPI.  ROS:     Review of Systems   Constitutional:  Negative for appetite change, chills, fatigue and unexpected weight change.   HENT:  Positive for congestion, postnasal drip, sinus pressure and sore throat.    Respiratory:  Positive for cough. Negative for shortness of breath.    Gastrointestinal:  Negative for diarrhea, nausea and vomiting.   Neurological:  Negative for dizziness and headaches.         Pertinent positives and negatives noted in the the HPI.    PHYSICAL EXAM:   /82 (BP Location: Left arm, Patient Position: Sitting, Cuff Size: adult)   Pulse 97   Temp 97.3 °F (36.3 °C) (Temporal)   Resp 16   Wt 182 lb (82.6 kg)   SpO2 96%   BMI 27.35 kg/m²   Vital signs reviewed.Appears stated age, well groomed.  Physical Exam  Vitals reviewed.   Constitutional:       Appearance: Normal appearance.   HENT:      Head: Normocephalic.      Right Ear: Ear canal and external ear normal.      Left Ear: Ear canal and external ear normal.      Ears:      Comments: TM opacity bilaterally  No erythema, not bulging     Nose: Congestion present.      Mouth/Throat:      Mouth: Mucous membranes are moist.      Pharynx: Oropharynx is clear. Posterior oropharyngeal erythema present. No oropharyngeal exudate.      Comments: +cobblestoning of oropharynx    Eyes:      Conjunctiva/sclera: Conjunctivae normal.   Neck:      Vascular: No carotid bruit.   Cardiovascular:      Rate and Rhythm: Normal rate and regular rhythm.      Pulses: Normal pulses.       Heart sounds: Normal heart sounds.   Pulmonary:      Effort: Pulmonary effort is normal.      Breath sounds: Normal breath sounds.   Musculoskeletal:      Cervical back: Normal range of motion and neck supple.   Lymphadenopathy:      Cervical: Cervical adenopathy present.   Skin:     General: Skin is warm and dry.   Neurological:      Mental Status: He is alert and oriented to person, place, and time.   Psychiatric:         Behavior: Behavior normal.          LABS     No visits with results within 2 Month(s) from this visit.   Latest known visit with results is:   Office Visit on 03/15/2024   Component Date Value    WHITE BLOOD CELL COUNT 05/08/2024 7.1     RED BLOOD CELL COUNT 05/08/2024 5.34     HEMOGLOBIN 05/08/2024 16.9     HEMATOCRIT 05/08/2024 49.4     MCV 05/08/2024 92.5     MCH 05/08/2024 31.6     MCHC 05/08/2024 34.2     RDW 05/08/2024 12.8     PLATELET COUNT 05/08/2024 260     MPV 05/08/2024 9.5     ABSOLUTE NEUTROPHILS 05/08/2024 4,388     ABSOLUTE LYMPHOCYTES 05/08/2024 1,974     ABSOLUTE MONOCYTES 05/08/2024 518     ABSOLUTE EOSINOPHILS 05/08/2024 170     ABSOLUTE BASOPHILS 05/08/2024 50     NEUTROPHILS 05/08/2024 61.8     LYMPHOCYTES 05/08/2024 27.8     MONOCYTES 05/08/2024 7.3     EOSINOPHILS 05/08/2024 2.4     BASOPHILS 05/08/2024 0.7     GLUCOSE 05/08/2024 92     UREA NITROGEN (BUN) 05/08/2024 14     CREATININE 05/08/2024 0.84     EGFR 05/08/2024 102     BUN/CREATININE RATIO 05/08/2024 SEE NOTE:     SODIUM 05/08/2024 140     POTASSIUM 05/08/2024 4.2     CHLORIDE 05/08/2024 103     CARBON DIOXIDE 05/08/2024 28     CALCIUM 05/08/2024 9.1     PROTEIN, TOTAL 05/08/2024 7.4     ALBUMIN 05/08/2024 4.4     GLOBULIN 05/08/2024 3.0     ALBUMIN/GLOBULIN RATIO 05/08/2024 1.5     BILIRUBIN, TOTAL 05/08/2024 0.6     ALKALINE PHOSPHATASE 05/08/2024 95     AST 05/08/2024 30     ALT 05/08/2024 36     CHOLESTEROL, TOTAL 05/08/2024 182     HDL CHOLESTEROL 05/08/2024 47     TRIGLYCERIDES 05/08/2024 139      LDL-CHOLESTEROL 2024 110 (H)     CHOL/HDLC RATIO 2024 3.9     NON-HDL CHOLESTEROL 2024 135 (H)     TSH W/REFLEX TO FT4 2024 2.88       REVIEWED THIS VISIT  ASSESSMENT/PLAN:   59 year old male with    1. Acute maxillary sinusitis, recurrence not specified  - had telehealth visit and wa Rx'd Azithromycin, no relief  - start Augmentin Q12 x 7 days, R/B/SEs of new med d/w pt  - start Fluticasone nasal spray daily  - supportive care d/w pt  - if sx worsen or persist, advised to call or RTC    - fluticasone propionate 50 MCG/ACT Nasal Suspension; 2 sprays by Each Nare route daily.  Dispense: 1 each; Refill: 0  - amoxicillin clavulanate 875-125 MG Oral Tab; Take 1 tablet by mouth 2 (two) times daily for 7 days.  Dispense: 14 tablet; Refill: 0    2. Post-nasal drip  See above.    - fluticasone propionate 50 MCG/ACT Nasal Suspension; 2 sprays by Each Nare route daily.  Dispense: 1 each; Refill: 0  - amoxicillin clavulanate 875-125 MG Oral Tab; Take 1 tablet by mouth 2 (two) times daily for 7 days.  Dispense: 14 tablet; Refill: 0      Meds This Visit:  Requested Prescriptions     Signed Prescriptions Disp Refills    fluticasone propionate 50 MCG/ACT Nasal Suspension 1 each 0     Si sprays by Each Nare route daily.    amoxicillin clavulanate 875-125 MG Oral Tab 14 tablet 0     Sig: Take 1 tablet by mouth 2 (two) times daily for 7 days.       Health Maintenance:  Health Maintenance   Topic Date Due    HTN: BP Follow-Up  2025    Pneumococcal Vaccine: 50+ Years (1 of 1 - PCV) 2025 (Originally 2017)    Zoster Vaccines (1 of 2) 2025 (Originally 2017)    COVID-19 Vaccine (1 - - season) 2025 (Originally 2024)    Influenza Vaccine (1) 2025 (Originally 10/1/2024)    PSA  2025    Colorectal Cancer Screening  2025    Annual Physical  2026    DTaP,Tdap,and Td Vaccines (2 - Td or Tdap) 2029    Annual Depression Screening  Completed     Meningococcal B Vaccine  Aged Out         Patient/Caregiver Education: There are no barriers to learning. Medical education done.   Outcome: Patient verbalizes understanding and agrees with plan. Advised to call or RTC if symptoms persist or worsen.    Problem List:     Patient Active Problem List   Diagnosis    Herpes labialis    Seasonal allergic rhinitis due to pollen    History of skin cancer       Imaging & Referrals:  None     3/19/2025  Soheila Ferreira MD      Patient understands plan and follow-up.  Return if symptoms worsen or fail to improve.              [1] No Known Allergies

## 2025-05-20 ENCOUNTER — TELEPHONE (OUTPATIENT)
Dept: FAMILY MEDICINE CLINIC | Facility: CLINIC | Age: 60
End: 2025-05-20

## 2025-05-20 DIAGNOSIS — R09.82 POST-NASAL DRIP: ICD-10-CM

## 2025-05-20 DIAGNOSIS — J01.00 ACUTE MAXILLARY SINUSITIS, RECURRENCE NOT SPECIFIED: ICD-10-CM

## 2025-05-20 DIAGNOSIS — J30.1 SEASONAL ALLERGIC RHINITIS DUE TO POLLEN: Primary | ICD-10-CM

## 2025-05-20 RX ORDER — FLUTICASONE PROPIONATE 50 MCG
2 SPRAY, SUSPENSION (ML) NASAL DAILY
Qty: 1 EACH | Refills: 2 | Status: SHIPPED | OUTPATIENT
Start: 2025-05-20

## 2025-05-20 NOTE — TELEPHONE ENCOUNTER
Medication and dose requested: Fluticasone    Pharmacy name/location:    [id number] - [place], [place] - [address] AT [address], [phone], [phone]  [address]  [facility] [address]  Phone: [phone] Fax: [phone]       Additional notes:

## 2025-05-20 NOTE — TELEPHONE ENCOUNTER
Requested Prescriptions     Pending Prescriptions Disp Refills    fluticasone propionate 50 MCG/ACT Nasal Suspension 1 each 2     Si sprays by Each Nare route daily.     LOV 3/19/2025 - acute   Last physical 25    Patient was asked to follow-up in: 1 year    Appointment scheduled: Visit date not found     Medication refilled per protocol.

## 2025-06-12 LAB
ABSOLUTE BASOPHILS: 43 CELLS/UL (ref 0–200)
ABSOLUTE EOSINOPHILS: 353 CELLS/UL (ref 15–500)
ABSOLUTE LYMPHOCYTES: 2066 CELLS/UL (ref 850–3900)
ABSOLUTE MONOCYTES: 511 CELLS/UL (ref 200–950)
ABSOLUTE NEUTROPHILS: 4226 CELLS/UL (ref 1500–7800)
ALBUMIN/GLOBULIN RATIO: 1.7 (CALC) (ref 1–2.5)
ALBUMIN: 4.3 G/DL (ref 3.6–5.1)
ALKALINE PHOSPHATASE: 98 U/L (ref 35–144)
ALT: 43 U/L (ref 9–46)
AST: 31 U/L (ref 10–35)
BASOPHILS: 0.6 %
BILIRUBIN, TOTAL: 0.9 MG/DL (ref 0.2–1.2)
BUN: 16 MG/DL (ref 7–25)
CALCIUM: 8.9 MG/DL (ref 8.6–10.3)
CARBON DIOXIDE: 28 MMOL/L (ref 20–32)
CHLORIDE: 104 MMOL/L (ref 98–110)
CHOL/HDLC RATIO: 4.2 (CALC)
CHOLESTEROL, TOTAL: 181 MG/DL
CREATININE: 0.83 MG/DL (ref 0.7–1.3)
EGFR: 102 ML/MIN/1.73M2
EOSINOPHILS: 4.9 %
GLOBULIN: 2.6 G/DL (CALC) (ref 1.9–3.7)
GLUCOSE: 94 MG/DL (ref 65–99)
HDL CHOLESTEROL: 43 MG/DL
HEMATOCRIT: 50.2 % (ref 38.5–50)
HEMOGLOBIN: 17.1 G/DL (ref 13.2–17.1)
LDL-CHOLESTEROL: 110 MG/DL (CALC)
LYMPHOCYTES: 28.7 %
MCH: 32.2 PG (ref 27–33)
MCHC: 34.1 G/DL (ref 32–36)
MCV: 94.5 FL (ref 80–100)
MONOCYTES: 7.1 %
MPV: 9.7 FL (ref 7.5–12.5)
NEUTROPHILS: 58.7 %
NON-HDL CHOLESTEROL: 138 MG/DL (CALC)
PLATELET COUNT: 248 THOUSAND/UL (ref 140–400)
POTASSIUM: 3.9 MMOL/L (ref 3.5–5.3)
PROTEIN, TOTAL: 6.9 G/DL (ref 6.1–8.1)
PSA, TOTAL: 0.39 NG/ML
RDW: 13 % (ref 11–15)
RED BLOOD CELL COUNT: 5.31 MILLION/UL (ref 4.2–5.8)
SODIUM: 141 MMOL/L (ref 135–146)
TRIGLYCERIDES: 165 MG/DL
TSH W/REFLEX TO FT4: 2.63 MIU/L (ref 0.4–4.5)
WHITE BLOOD CELL COUNT: 7.2 THOUSAND/UL (ref 3.8–10.8)

## 2025-08-27 ENCOUNTER — PATIENT MESSAGE (OUTPATIENT)
Dept: FAMILY MEDICINE CLINIC | Facility: CLINIC | Age: 60
End: 2025-08-27